# Patient Record
Sex: FEMALE | Race: WHITE | NOT HISPANIC OR LATINO | URBAN - METROPOLITAN AREA
[De-identification: names, ages, dates, MRNs, and addresses within clinical notes are randomized per-mention and may not be internally consistent; named-entity substitution may affect disease eponyms.]

---

## 2018-09-26 ENCOUNTER — EMERGENCY (EMERGENCY)
Facility: HOSPITAL | Age: 83
LOS: 1 days | Discharge: HOME | End: 2018-09-26
Attending: EMERGENCY MEDICINE
Payer: MEDICARE

## 2018-09-26 VITALS
RESPIRATION RATE: 20 BRPM | OXYGEN SATURATION: 95 % | TEMPERATURE: 98 F | WEIGHT: 164.91 LBS | DIASTOLIC BLOOD PRESSURE: 113 MMHG | HEART RATE: 85 BPM | SYSTOLIC BLOOD PRESSURE: 184 MMHG | HEIGHT: 65 IN

## 2018-09-26 PROCEDURE — 99283 EMERGENCY DEPT VISIT LOW MDM: CPT

## 2018-09-26 NOTE — ED PROVIDER NOTE - CHPI ED SYMPTOMS NEG
no headache/no loss of consciousness/no crying/no decreased eating/drinking/no back pain/no difficulty bearing weight/no fussiness/no disorientation/no dizziness/no laceration/no bruising/no neck tenderness/no sleeping issues/no pain
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
## 2018-09-26 NOTE — ED ADULT NURSE NOTE - NSIMPLEMENTINTERV_GEN_ALL_ED
Implemented All Universal Safety Interventions:  [place] to call system. Call bell, personal items and telephone within reach. Instruct patient to call for assistance. Room bathroom lighting operational. Non-slip footwear when patient is off stretcher. Physically safe environment: no spills, clutter or unnecessary equipment. Stretcher in lowest position, wheels locked, appropriate side rails in place.
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
## 2022-06-02 PROBLEM — Z00.00 ENCOUNTER FOR PREVENTIVE HEALTH EXAMINATION: Status: ACTIVE | Noted: 2022-06-02

## 2022-06-14 ENCOUNTER — APPOINTMENT (OUTPATIENT)
Dept: UROLOGY | Facility: CLINIC | Age: 87
End: 2022-06-14

## 2023-03-09 ENCOUNTER — APPOINTMENT (OUTPATIENT)
Dept: ORTHOPEDIC SURGERY | Facility: CLINIC | Age: 88
End: 2023-03-09
Payer: MEDICARE

## 2023-03-09 VITALS — DIASTOLIC BLOOD PRESSURE: 76 MMHG | SYSTOLIC BLOOD PRESSURE: 194 MMHG

## 2023-03-09 DIAGNOSIS — M25.561 PAIN IN RIGHT KNEE: ICD-10-CM

## 2023-03-09 DIAGNOSIS — M25.562 PAIN IN RIGHT KNEE: ICD-10-CM

## 2023-03-09 PROCEDURE — 99202 OFFICE O/P NEW SF 15 MIN: CPT

## 2023-03-09 PROCEDURE — 99072 ADDL SUPL MATRL&STAF TM PHE: CPT

## 2023-03-09 NOTE — DISCUSSION/SUMMARY
[de-identified] : Patient is a 89 year old female with subjectively no pain in the bilateral knees. She is unsure why she has this appoint. The aid in the room is also unsure. We discussed staying active with exercise at the facility. She will return if pain occurs.

## 2023-03-09 NOTE — PHYSICAL EXAM
[de-identified] : Multi body exam \par The patient appears well nourished and in no apparent distress. The patient is alert and oriented to person, place, and time. Affect and mood appear normal. The head is normocephalic and atraumatic. The eyes reveal normal sclera and extra ocular muscles are intact. The tongue is midline with no apparent lesions. Skin shows normal turgor with no evidence of eczema or psoriasis. No respiratory distress noted. Sensation grossly intact.\par   [de-identified] : Exam right knee: valgus alignment, no effusion, skin WNL, ROM 0-120\par Exam Left knee: neutral alignment, no effusion, skin WNL, ROM 0-125 [de-identified] : No Bridging, no combined external rotation with extension images taken today- patient refused.

## 2023-03-09 NOTE — HISTORY OF PRESENT ILLNESS
[de-identified] : Ms. PAYTON CRANDALL is a 89 year old female presenting for evaluation of her bilateral knees. She lives at an assisted living facility called [facility]. She states she currently has no knee pain however. She walks with a walker but is presenting today in a wheelchair. Patient denies any falls or trauma. She has not had treatment for her knees.